# Patient Record
Sex: MALE | Race: WHITE | NOT HISPANIC OR LATINO | URBAN - METROPOLITAN AREA
[De-identification: names, ages, dates, MRNs, and addresses within clinical notes are randomized per-mention and may not be internally consistent; named-entity substitution may affect disease eponyms.]

---

## 2023-04-24 ENCOUNTER — NURSING HOME VISIT (OUTPATIENT)
Dept: POST ACUTE CARE | Facility: EXTERNAL LOCATION | Age: 88
End: 2023-04-24

## 2023-04-24 VITALS — HEART RATE: 76 BPM | DIASTOLIC BLOOD PRESSURE: 82 MMHG | TEMPERATURE: 97.9 F | SYSTOLIC BLOOD PRESSURE: 126 MMHG

## 2023-04-24 DIAGNOSIS — I25.10 ATHEROSCLEROSIS OF NATIVE CORONARY ARTERY OF NATIVE HEART WITHOUT ANGINA PECTORIS: ICD-10-CM

## 2023-04-24 DIAGNOSIS — C61 MALIGNANT NEOPLASM OF PROSTATE (MULTI): ICD-10-CM

## 2023-04-24 DIAGNOSIS — I10 PRIMARY HYPERTENSION: Primary | ICD-10-CM

## 2023-04-24 DIAGNOSIS — R41.81 AGE-RELATED COGNITIVE DECLINE: ICD-10-CM

## 2023-04-24 DIAGNOSIS — E78.49 OTHER HYPERLIPIDEMIA: ICD-10-CM

## 2023-04-24 PROCEDURE — 99342 HOME/RES VST NEW LOW MDM 30: CPT | Performed by: NURSE PRACTITIONER

## 2023-04-24 NOTE — ASSESSMENT & PLAN NOTE
Resident stable and problem well controlled. Will continue same orders and treatment  On metoprolol

## 2023-04-24 NOTE — PROGRESS NOTES
Subjective   Mandy Wong is a 88 y.o. male who is assisted living/ home patient being seen and evaluated for multiple medical problems.    50% of time was spent on preparing to see the patient, performing exam or evaluation, coordination of care, ordering medications,tests and labs, referring and communicating with other health care providers , interpreting results, obtaining and reviewing history, counseling and educating the family/patient explanation of tests, medications and documenting clinical information  EMR. Time spent >35 minutes    88y old male now a resident at [facility]. He is from [place] and his wife recently broke her femur, she is his care taker because of his cognitive decline .They moved to be closer to family for now. 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 Current medications; metoprolol,Crestor and ASA. No current concerns             Objective   /82   Pulse 76   Temp 36.6 °C (97.9 °F)     Physical Exam  Vitals and nursing note reviewed.   Constitutional:       General: He is not in acute distress.  HENT:      Head: Normocephalic and atraumatic.   Eyes:      Pupils: Pupils are equal, round, and reactive to light.   Cardiovascular:      Rate and Rhythm: Normal rate and regular rhythm.   Pulmonary:      Effort: Pulmonary effort is normal.      Breath sounds: Normal breath sounds.   Musculoskeletal:      Right lower leg: No edema.      Left lower leg: No edema.   Skin:     General: Skin is warm and dry.   Neurological:      Mental Status: He is alert.   Psychiatric:         Mood and Affect: Mood normal.         Assessment/Plan   Problem List Items Addressed This Visit       Malignant neoplasm of prostate (CMS/HCC)    Other hyperlipidemia     Resident stable and problem well controlled. Will continue same orders and treatment  On crestor         Primary hypertension - Primary     Resident stable and problem well controlled. Will continue same orders and treatment  On  metoprolol         Atherosclerosis of native coronary artery of native heart without angina pectoris     On ASA  Resident stable and problem well controlled. Will continue same orders and treatment         Age-related cognitive decline

## 2023-06-19 ENCOUNTER — NURSING HOME VISIT (OUTPATIENT)
Dept: POST ACUTE CARE | Facility: EXTERNAL LOCATION | Age: 88
End: 2023-06-19

## 2023-06-19 VITALS
DIASTOLIC BLOOD PRESSURE: 69 MMHG | SYSTOLIC BLOOD PRESSURE: 145 MMHG | WEIGHT: 131.4 LBS | TEMPERATURE: 97.8 F | HEART RATE: 61 BPM

## 2023-06-19 DIAGNOSIS — R41.81 AGE-RELATED COGNITIVE DECLINE: Primary | ICD-10-CM

## 2023-06-19 DIAGNOSIS — F02.B0 MODERATE LATE ONSET ALZHEIMER'S DEMENTIA WITHOUT BEHAVIORAL DISTURBANCE, PSYCHOTIC DISTURBANCE, MOOD DISTURBANCE, OR ANXIETY (MULTI): ICD-10-CM

## 2023-06-19 DIAGNOSIS — G30.1 MODERATE LATE ONSET ALZHEIMER'S DEMENTIA WITHOUT BEHAVIORAL DISTURBANCE, PSYCHOTIC DISTURBANCE, MOOD DISTURBANCE, OR ANXIETY (MULTI): ICD-10-CM

## 2023-06-19 PROCEDURE — 99349 HOME/RES VST EST MOD MDM 40: CPT | Performed by: NURSE PRACTITIONER

## 2023-06-19 NOTE — PROGRESS NOTES
Subjective   Mandy Wong is a 88 y.o. male who is assisted living/ home patient being seen and evaluated for multiple medical problems.    Asked to visit because of concerns of his forgetfulness. He most likely has some age related dementia, he has recently moved here from [place] with his wife and he wants to go back, he is worried about his home. He is unable to given me today's date, he cannot recognize family members in pictures and unable to remember their names . His wife admits to becoming a little impatient with him because of his forgetfulness. He does not wander and is not combative or angry. Plan to check labs and monitor, she does not want to start any medications at this time. Blood work done 1 month ago is good.     50% of time was spent on preparing to see the patient, performing exam or evaluation, coordination of care, ordering medications,tests and labs, referring and communicating with other health care providers , interpreting results, obtaining and reviewing history, tests, medications and documenting clinical information  EMR. Time spent >35 minutes             Objective   /69   Pulse 61   Temp 36.6 °C (97.8 °F)   Wt 59.6 kg (131 lb 6.4 oz)     Physical Exam  Vitals and nursing note reviewed.   Constitutional:       General: He is not in acute distress.  HENT:      Head: Normocephalic and atraumatic.      Ears:      Comments: [place]  Pulmonary:      Effort: Pulmonary effort is normal.   Musculoskeletal:      Right lower leg: No edema.      Left lower leg: No edema.   Skin:     General: Skin is warm and dry.   Neurological:      Mental Status: He is alert. He is disoriented.   Psychiatric:         Mood and Affect: Mood normal.         Assessment/Plan   Problem List Items Addressed This Visit       Age-related cognitive decline - Primary    Moderate late onset Alzheimer's dementia without behavioral disturbance, psychotic disturbance, mood disturbance, or anxiety (CMS/[facility])     Patient stable,  will continue same treatment and monitor. Nursing to inform CNP/MD of any changes in condition or new problems    Recheck CBC, BMP and UA C&S

## 2023-06-19 NOTE — ASSESSMENT & PLAN NOTE
Patient stable, will continue same treatment and monitor. Nursing to inform CNP/MD of any changes in condition or new problems    Recheck CBC, BMP and UA C&S

## 2023-07-22 PROCEDURE — G0180 MD CERTIFICATION HHA PATIENT: HCPCS | Performed by: STUDENT IN AN ORGANIZED HEALTH CARE EDUCATION/TRAINING PROGRAM

## 2023-08-16 ENCOUNTER — NURSING HOME VISIT (OUTPATIENT)
Dept: PRIMARY CARE | Facility: CLINIC | Age: 88
End: 2023-08-16

## 2023-08-16 DIAGNOSIS — E78.49 OTHER HYPERLIPIDEMIA: ICD-10-CM

## 2023-08-16 DIAGNOSIS — C61 MALIGNANT NEOPLASM OF PROSTATE (MULTI): ICD-10-CM

## 2023-08-16 DIAGNOSIS — G30.1 MODERATE LATE ONSET ALZHEIMER'S DEMENTIA WITHOUT BEHAVIORAL DISTURBANCE, PSYCHOTIC DISTURBANCE, MOOD DISTURBANCE, OR ANXIETY (MULTI): ICD-10-CM

## 2023-08-16 DIAGNOSIS — I10 PRIMARY HYPERTENSION: Primary | ICD-10-CM

## 2023-08-16 DIAGNOSIS — F02.B0 MODERATE LATE ONSET ALZHEIMER'S DEMENTIA WITHOUT BEHAVIORAL DISTURBANCE, PSYCHOTIC DISTURBANCE, MOOD DISTURBANCE, OR ANXIETY (MULTI): ICD-10-CM

## 2023-08-16 DIAGNOSIS — I25.10 ATHEROSCLEROSIS OF NATIVE CORONARY ARTERY OF NATIVE HEART WITHOUT ANGINA PECTORIS: ICD-10-CM

## 2023-08-16 DIAGNOSIS — R41.81 AGE-RELATED COGNITIVE DECLINE: ICD-10-CM

## 2023-08-16 PROCEDURE — 99344 HOME/RES VST NEW MOD MDM 60: CPT | Performed by: STUDENT IN AN ORGANIZED HEALTH CARE EDUCATION/TRAINING PROGRAM

## 2023-08-24 NOTE — PROGRESS NOTES
Subjective   Patient ID: Mandy Wong is a 88 y.o. male.       Pt is a 88 year old male who resides at AL in [place] [facility]. PMHx includes HLD, HTN, CAD who reports no acute complaints or concerns. Recently had a fall where he was noted to have a wound. Home health care is currently following for this. He reports no issues and wound is healing well. Does note that he has been having issues with bradycardia. Regards no additional issues or concerns at this time.           Review of Systems   Constitutional: Negative.    HENT: Negative.     Respiratory: Negative.     Cardiovascular: Negative.    Gastrointestinal: Negative.    Musculoskeletal: Negative.    Skin:  Positive for wound.   Neurological: Negative.    Psychiatric/Behavioral: Negative.                 Objective   Vitals Reviewed via facility EMR   Physical Exam  Constitutional:       General: She is not in acute distress.     Appearance: She is not ill-appearing.   HENT:      Mouth/Throat:      Mouth: Mucous membranes are moist.      Pharynx: Oropharynx is clear. No oropharyngeal exudate or posterior oropharyngeal erythema.   Eyes:      Pupils: Pupils are equal, round, and reactive to light.   Cardiovascular:      Rate and Rhythm: Bradycardia present. Rhythm irregular.      Heart sounds: No murmur heard.  Pulmonary:      Effort: Pulmonary effort is normal. No respiratory distress.      Breath sounds: No wheezing.   Abdominal:      General: Abdomen is flat. Bowel sounds are normal. There is no distension.      Palpations: Abdomen is soft.      Tenderness: There is no abdominal tenderness.   Musculoskeletal:         General: No tenderness. Normal range of motion.   Skin:     General: Skin is warm and dry.      Comments: RLE wound wrapped   Neurological:      General: No focal deficit present.      Mental Status: She is alert and oriented to person, place, and time. Mental status is at baseline.   Psychiatric:         Mood and Affect: Mood normal.                     Pt seen and examined, noted bradycardia on exam, recommend ECG, is currently asymptomatic at this time. Medications reviewed, overall stable. Continue supportive care, routine labs       Assessment/Plan   Diagnoses and all orders for this visit:  Primary hypertension  Other hyperlipidemia  Atherosclerosis of native coronary artery of native heart without angina pectoris  Moderate late onset Alzheimer's dementia without behavioral disturbance, psychotic disturbance, mood disturbance, or anxiety (CMS/HCC)  Age-related cognitive decline  Malignant neoplasm of prostate (CMS/HCC)        Pt seen and examined, noted bradycardia on exam, recommend ECG, is currently asymptomatic at this time. Medications reviewed, overall stable. Continue supportive care, routine labs    >60 minutes spent in management of pt

## 2023-09-06 ENCOUNTER — NURSING HOME VISIT (OUTPATIENT)
Dept: POST ACUTE CARE | Facility: EXTERNAL LOCATION | Age: 88
End: 2023-09-06
Payer: MEDICARE

## 2023-09-06 DIAGNOSIS — I25.10 ATHEROSCLEROSIS OF NATIVE CORONARY ARTERY OF NATIVE HEART WITHOUT ANGINA PECTORIS: ICD-10-CM

## 2023-09-06 DIAGNOSIS — F02.B0 MODERATE LATE ONSET ALZHEIMER'S DEMENTIA WITHOUT BEHAVIORAL DISTURBANCE, PSYCHOTIC DISTURBANCE, MOOD DISTURBANCE, OR ANXIETY (MULTI): ICD-10-CM

## 2023-09-06 DIAGNOSIS — I10 PRIMARY HYPERTENSION: Primary | ICD-10-CM

## 2023-09-06 DIAGNOSIS — U07.1 COVID-19: ICD-10-CM

## 2023-09-06 DIAGNOSIS — G30.1 MODERATE LATE ONSET ALZHEIMER'S DEMENTIA WITHOUT BEHAVIORAL DISTURBANCE, PSYCHOTIC DISTURBANCE, MOOD DISTURBANCE, OR ANXIETY (MULTI): ICD-10-CM

## 2023-09-06 DIAGNOSIS — C61 MALIGNANT NEOPLASM OF PROSTATE (MULTI): ICD-10-CM

## 2023-09-06 PROCEDURE — 99342 HOME/RES VST NEW LOW MDM 30: CPT | Performed by: STUDENT IN AN ORGANIZED HEALTH CARE EDUCATION/TRAINING PROGRAM

## 2023-09-06 NOTE — LETTER
September 10, 2023     Cr carballo    Patient: Mandy Wong   YOB: 1935   Date of Visit: 9/6/2023       Dear Dr. Cr carballo:    Thank you for referring Mandy Wong to me for evaluation. Below are my notes for this consultation.  If you have questions, please do not hesitate to call me. I look forward to following your patient along with you.       Sincerely,     Yonatan Blood,       CC: No Recipients  ______________________________________________________________________________________    Subjective  Patient ID: Mandy Wong is a 88 y.o. male.    Pt seen and examined. States overall doing well. Recently tested positive for covid-19. States no acute complaints or concerns at this time.         Review of Systems   Constitutional: Negative.    HENT: Negative.     Respiratory:  Positive for cough.    Cardiovascular: Negative.    Gastrointestinal: Negative.    Musculoskeletal: Negative.    Skin: Negative.    Neurological: Negative.    Psychiatric/Behavioral: Negative.         ObjectiveVitals Reviewed via facility EMR   Physical Exam  Vitals and nursing note reviewed.   Constitutional:       General: He is not in acute distress.     Appearance: Normal appearance.   HENT:      Mouth/Throat:      Mouth: Mucous membranes are moist.      Pharynx: Oropharynx is clear. No oropharyngeal exudate.   Eyes:      Extraocular Movements: Extraocular movements intact.      Pupils: Pupils are equal, round, and reactive to light.   Cardiovascular:      Rate and Rhythm: Normal rate and regular rhythm.      Pulses: Normal pulses.      Heart sounds: Normal heart sounds. No murmur heard.  Pulmonary:      Effort: Pulmonary effort is normal. No respiratory distress.   Abdominal:      General: Abdomen is flat. Bowel sounds are normal. There is no distension.      Tenderness: There is no abdominal tenderness.   Musculoskeletal:         General: Normal range of motion.      Right lower leg: No edema.      Left  lower leg: No edema.   Skin:     General: Skin is warm and dry.      Capillary Refill: Capillary refill takes less than 2 seconds.   Neurological:      General: No focal deficit present.      Mental Status: He is alert. Mental status is at baseline.      Cranial Nerves: No cranial nerve deficit.      Motor: No weakness.   Psychiatric:         Mood and Affect: Mood normal.         Thought Content: Thought content normal.         Assessment/Plan  Diagnoses and all orders for this visit:  Primary hypertension  Atherosclerosis of native coronary artery of native heart without angina pectoris  Malignant neoplasm of prostate (CMS/HCC)  Moderate late onset Alzheimer's dementia without behavioral disturbance, psychotic disturbance, mood disturbance, or anxiety (CMS/HCC)  COVID-19        Pt seen and examined, medically stable from COVID-19. Started on anti-virals. Continue supportive care, breathing treatments. Closely monitor vitals

## 2023-09-10 PROBLEM — U07.1 COVID-19: Status: ACTIVE | Noted: 2023-09-10

## 2023-09-10 ASSESSMENT — ENCOUNTER SYMPTOMS
CONSTITUTIONAL NEGATIVE: 1
GASTROINTESTINAL NEGATIVE: 1
COUGH: 1
CARDIOVASCULAR NEGATIVE: 1
MUSCULOSKELETAL NEGATIVE: 1
PSYCHIATRIC NEGATIVE: 1
NEUROLOGICAL NEGATIVE: 1

## 2023-09-10 NOTE — PROGRESS NOTES
Subjective   Patient ID: Mandy Wong is a 88 y.o. male.    Pt seen and examined. States overall doing well. Recently tested positive for covid-19. States no acute complaints or concerns at this time.         Review of Systems   Constitutional: Negative.    HENT: Negative.     Respiratory:  Positive for cough.    Cardiovascular: Negative.    Gastrointestinal: Negative.    Musculoskeletal: Negative.    Skin: Negative.    Neurological: Negative.    Psychiatric/Behavioral: Negative.         Objective Vitals Reviewed via facility EMR   Physical Exam  Vitals and nursing note reviewed.   Constitutional:       General: He is not in acute distress.     Appearance: Normal appearance.   HENT:      Mouth/Throat:      Mouth: Mucous membranes are moist.      Pharynx: Oropharynx is clear. No oropharyngeal exudate.   Eyes:      Extraocular Movements: Extraocular movements intact.      Pupils: Pupils are equal, round, and reactive to light.   Cardiovascular:      Rate and Rhythm: Normal rate and regular rhythm.      Pulses: Normal pulses.      Heart sounds: Normal heart sounds. No murmur heard.  Pulmonary:      Effort: Pulmonary effort is normal. No respiratory distress.   Abdominal:      General: Abdomen is flat. Bowel sounds are normal. There is no distension.      Tenderness: There is no abdominal tenderness.   Musculoskeletal:         General: Normal range of motion.      Right lower leg: No edema.      Left lower leg: No edema.   Skin:     General: Skin is warm and dry.      Capillary Refill: Capillary refill takes less than 2 seconds.   Neurological:      General: No focal deficit present.      Mental Status: He is alert. Mental status is at baseline.      Cranial Nerves: No cranial nerve deficit.      Motor: No weakness.   Psychiatric:         Mood and Affect: Mood normal.         Thought Content: Thought content normal.         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 Started on anti-virals. Continue supportive care, breathing treatments. Closely monitor vitals

## 2023-09-27 ENCOUNTER — NURSING HOME VISIT (OUTPATIENT)
Dept: POST ACUTE CARE | Facility: EXTERNAL LOCATION | Age: 88
End: 2023-09-27
Payer: MEDICARE

## 2023-09-27 DIAGNOSIS — F43.0 ACUTE STRESS REACTION: ICD-10-CM

## 2023-09-27 DIAGNOSIS — R41.81 AGE-RELATED COGNITIVE DECLINE: ICD-10-CM

## 2023-09-27 DIAGNOSIS — G30.1 MODERATE LATE ONSET ALZHEIMER'S DEMENTIA WITHOUT BEHAVIORAL DISTURBANCE, PSYCHOTIC DISTURBANCE, MOOD DISTURBANCE, OR ANXIETY (MULTI): ICD-10-CM

## 2023-09-27 DIAGNOSIS — F02.B0 MODERATE LATE ONSET ALZHEIMER'S DEMENTIA WITHOUT BEHAVIORAL DISTURBANCE, PSYCHOTIC DISTURBANCE, MOOD DISTURBANCE, OR ANXIETY (MULTI): ICD-10-CM

## 2023-09-27 DIAGNOSIS — U07.1 COVID-19: ICD-10-CM

## 2023-09-27 DIAGNOSIS — I10 PRIMARY HYPERTENSION: Primary | ICD-10-CM

## 2023-09-27 PROCEDURE — 99348 HOME/RES VST EST LOW MDM 30: CPT | Performed by: STUDENT IN AN ORGANIZED HEALTH CARE EDUCATION/TRAINING PROGRAM

## 2023-09-30 PROBLEM — F43.0 ACUTE STRESS REACTION: Status: ACTIVE | Noted: 2023-09-30

## 2023-09-30 ASSESSMENT — ENCOUNTER SYMPTOMS
CONSTITUTIONAL NEGATIVE: 1
RESPIRATORY NEGATIVE: 1
NEUROLOGICAL NEGATIVE: 1
CARDIOVASCULAR NEGATIVE: 1
GASTROINTESTINAL NEGATIVE: 1
MUSCULOSKELETAL NEGATIVE: 1
PSYCHIATRIC NEGATIVE: 1

## 2023-10-01 NOTE — PROGRESS NOTES
Subjective   Patient ID: Mandy Wong is a 88 y.o. male.    Patient seen and evaluated.  Was seen on COVID follow-up.  With regards to COVID-19, he reports that he is overall feeling well states he is asymptomatic from this.  In regards no acute complaints or concerns.  He states that he is somewhat stressed out though with his wife being in the hospital recently.  Regards no other issues or concerns.        Review of Systems   Constitutional: Negative.    HENT: Negative.     Respiratory: Negative.     Cardiovascular: Negative.    Gastrointestinal: Negative.    Musculoskeletal: Negative.    Skin: Negative.    Neurological: Negative.    Psychiatric/Behavioral: Negative.         Objective Vitals Reviewed via facility EMR   Physical Exam  Constitutional:       General: He is not in acute distress.     Appearance: He is not ill-appearing.   Eyes:      Pupils: Pupils are equal, round, and reactive to light.   Cardiovascular:      Rate and Rhythm: Normal rate and regular rhythm.      Pulses: Normal pulses.      Heart sounds: No murmur heard.  Pulmonary:      Effort: No respiratory distress.      Breath sounds: No wheezing.   Abdominal:      General: Abdomen is flat. Bowel sounds are normal. There is no distension.   Musculoskeletal:      Right lower leg: No edema.      Left lower leg: No edema.   Skin:     General: Skin is warm and dry.   Neurological:      Mental Status: He is alert. Mental status is at baseline.      Cranial Nerves: No cranial nerve deficit.      Motor: No weakness.   Psychiatric:         Mood and Affect: Mood normal.         Behavior: Behavior normal.         Assessment/Plan   Diagnoses and all orders for this visit:  Primary hypertension  Moderate late onset Alzheimer's dementia without behavioral disturbance, psychotic disturbance, mood disturbance, or anxiety (CMS/HCC)  Age-related cognitive decline  COVID-19      Patient seen and examined.  Overall medically stable recovered well from COVID-19  monitor mood with wife being in the hospital as he seems to be having some underlying anxiety and stress secondary to this.  Continue all current medications.  No additional issues at this time.    Yonatan Blood DO

## 2023-10-10 ENCOUNTER — NURSING HOME VISIT (OUTPATIENT)
Dept: POST ACUTE CARE | Facility: EXTERNAL LOCATION | Age: 88
End: 2023-10-10
Payer: MEDICARE

## 2023-10-10 DIAGNOSIS — R41.81 AGE-RELATED COGNITIVE DECLINE: Primary | ICD-10-CM

## 2023-10-10 DIAGNOSIS — G30.1 MODERATE LATE ONSET ALZHEIMER'S DEMENTIA WITHOUT BEHAVIORAL DISTURBANCE, PSYCHOTIC DISTURBANCE, MOOD DISTURBANCE, OR ANXIETY (MULTI): ICD-10-CM

## 2023-10-10 DIAGNOSIS — F02.B0 MODERATE LATE ONSET ALZHEIMER'S DEMENTIA WITHOUT BEHAVIORAL DISTURBANCE, PSYCHOTIC DISTURBANCE, MOOD DISTURBANCE, OR ANXIETY (MULTI): ICD-10-CM

## 2023-10-10 DIAGNOSIS — I10 PRIMARY HYPERTENSION: ICD-10-CM

## 2023-10-10 PROBLEM — U07.1 COVID-19: Status: RESOLVED | Noted: 2023-09-10 | Resolved: 2023-10-10

## 2023-10-10 PROCEDURE — 99348 HOME/RES VST EST LOW MDM 30: CPT

## 2023-10-10 NOTE — PROGRESS NOTES
Visit  Note   Subjective   Mandy Wong is a 88 y.o. male who is being seen and evaluated for multiple medical problems. Nursing notes, vital signs, and labs were reviewed in the local facility chart.    Mandy is an 88-year-old male being seen in his facility this afternoon.  He denies any current complaints, and is seen ambulating and working in his garden during the visit.  He is inattentive but appears at his baseline mental status.  He is appropriately responsive to verbal stimuli and cooperative for the visit, but can become tangential.  He states that he has not noticed any residual COVID symptoms.       Objective   There were no vitals taken for this visit., vitals reviewed in nursing charts at facility  Physical Exam  Vitals reviewed.   Constitutional:       Appearance: Normal appearance.   HENT:      Head: Normocephalic and atraumatic.   Eyes:      Conjunctiva/sclera: Conjunctivae normal.   Cardiovascular:      Rate and Rhythm: Normal rate and regular rhythm.      Pulses: Normal pulses.      Heart sounds: Normal heart sounds.   Pulmonary:      Effort: Pulmonary effort is normal.      Breath sounds: Normal breath sounds.   Musculoskeletal:      Cervical back: Normal range of motion.   Skin:     General: Skin is warm and dry.   Neurological:      Mental Status: He is alert and oriented to person, place, and time.   Psychiatric:         Attention and Perception: He is inattentive.         Mood and Affect: Mood normal.         Behavior: Behavior normal. Behavior is cooperative.         Thought Content: Thought content normal.         Judgment: Judgment normal.       Assessment/Plan   Problem List Items Addressed This Visit       Primary hypertension    Age-related cognitive decline - Primary     -Doing well, no complaints today. Feels better since recovering from COVID  -Was gardening and ambulating without difficulty  -Was engaged and participating in examination         Moderate late onset Alzheimer's  dementia without behavioral disturbance, psychotic disturbance, mood disturbance, or anxiety (CMS/HCC)     -Patient continues to be stable, will continue treatment and monitor for mental status changes          Meds, orders, nursing notes reviewed.  Nurse will monitor and update physician of any change: Discussed with patient and provided information.  Supportive care will be given.  Follow-up in 1 month or earlier as needed.    Beatrice Dixon DO  Family Medicine Resident, PGY-1

## 2023-10-10 NOTE — ASSESSMENT & PLAN NOTE
-Doing well, no complaints today. Feels better since recovering from COVID  -Was gardening and ambulating without difficulty  -Was engaged and participating in examination

## 2023-11-07 ENCOUNTER — NURSING HOME VISIT (OUTPATIENT)
Dept: POST ACUTE CARE | Facility: EXTERNAL LOCATION | Age: 88
End: 2023-11-07
Payer: MEDICARE

## 2023-11-07 DIAGNOSIS — I10 PRIMARY HYPERTENSION: ICD-10-CM

## 2023-11-07 DIAGNOSIS — F02.B0 MODERATE LATE ONSET ALZHEIMER'S DEMENTIA WITHOUT BEHAVIORAL DISTURBANCE, PSYCHOTIC DISTURBANCE, MOOD DISTURBANCE, OR ANXIETY (MULTI): Primary | ICD-10-CM

## 2023-11-07 DIAGNOSIS — G30.1 MODERATE LATE ONSET ALZHEIMER'S DEMENTIA WITHOUT BEHAVIORAL DISTURBANCE, PSYCHOTIC DISTURBANCE, MOOD DISTURBANCE, OR ANXIETY (MULTI): Primary | ICD-10-CM

## 2023-11-07 DIAGNOSIS — I25.10 ATHEROSCLEROSIS OF NATIVE CORONARY ARTERY OF NATIVE HEART WITHOUT ANGINA PECTORIS: ICD-10-CM

## 2023-11-07 PROCEDURE — 99347 HOME/RES VST EST SF MDM 20: CPT

## 2023-11-07 NOTE — PROGRESS NOTES
Visit  Note   Subjective   Mandy Wong is a 88 y.o. male who is being seen and evaluated for multiple medical problems. Nursing notes, vital signs, and labs were reviewed in the local facility chart.    This is a 89 yo male with a PMHx Alzheimers dementia, HTN, and CAD who was examined today in his home. He is doing well overall and has no complaints today. He is staying active and doing yard work outside with the weather permitting.        Objective   There were no vitals taken for this visit.  Physical Exam  Constitutional:       General: He is not in acute distress.     Appearance: Normal appearance. He is not ill-appearing.   HENT:      Head: Normocephalic and atraumatic.      Right Ear: External ear normal.      Left Ear: External ear normal.   Eyes:      Conjunctiva/sclera: Conjunctivae normal.   Cardiovascular:      Rate and Rhythm: Normal rate and regular rhythm.      Pulses: Normal pulses.      Heart sounds: Normal heart sounds.   Pulmonary:      Effort: Pulmonary effort is normal.      Breath sounds: Normal breath sounds.   Abdominal:      General: There is no distension.      Palpations: Abdomen is soft.      Tenderness: There is no abdominal tenderness.   Musculoskeletal:      Cervical back: Normal range of motion.      Right lower leg: No edema.      Left lower leg: No edema.   Skin:     General: Skin is warm and dry.   Neurological:      General: No focal deficit present.      Mental Status: He is alert. Mental status is at baseline.   Psychiatric:         Mood and Affect: Mood normal.         Behavior: Behavior normal.       Assessment/Plan     Meds, orders, nursing notes reviewed.  Nurse will monitor and update physician of any change: Discussed with patient and provided information.  Supportive care will be given.  Follow-up in 1 month or earlier as needed.     Problem List Items Addressed This Visit       Primary hypertension     -Stable and problem well controlled, will continue same orders and  treatment (Metoprolol)         Atherosclerosis of native coronary artery of native heart without angina pectoris     -On ASA, he is stable and this problem well controlled. Will continue same orders and treatment          Moderate late onset Alzheimer's dementia without behavioral disturbance, psychotic disturbance, mood disturbance, or anxiety (CMS/HCC) - Primary     -Patient continues to be stable, will continue current treatments and monitor for mental status changes             Beatrice Dixon DO  Family Medicine Resident, PGY-1

## 2023-11-07 NOTE — ASSESSMENT & PLAN NOTE
-Patient continues to be stable, will continue current treatments and monitor for mental status changes

## 2023-12-20 ENCOUNTER — NURSING HOME VISIT (OUTPATIENT)
Dept: POST ACUTE CARE | Facility: EXTERNAL LOCATION | Age: 88
End: 2023-12-20
Payer: MEDICARE

## 2023-12-20 DIAGNOSIS — F52.8 HYPERSEXUALITY: Primary | ICD-10-CM

## 2023-12-20 DIAGNOSIS — F02.B0 MODERATE LATE ONSET ALZHEIMER'S DEMENTIA WITHOUT BEHAVIORAL DISTURBANCE, PSYCHOTIC DISTURBANCE, MOOD DISTURBANCE, OR ANXIETY (MULTI): ICD-10-CM

## 2023-12-20 DIAGNOSIS — I10 PRIMARY HYPERTENSION: ICD-10-CM

## 2023-12-20 DIAGNOSIS — G30.1 MODERATE LATE ONSET ALZHEIMER'S DEMENTIA WITHOUT BEHAVIORAL DISTURBANCE, PSYCHOTIC DISTURBANCE, MOOD DISTURBANCE, OR ANXIETY (MULTI): ICD-10-CM

## 2023-12-20 PROCEDURE — 99349 HOME/RES VST EST MOD MDM 40: CPT | Performed by: STUDENT IN AN ORGANIZED HEALTH CARE EDUCATION/TRAINING PROGRAM

## 2023-12-23 PROBLEM — F52.8 HYPERSEXUALITY: Status: ACTIVE | Noted: 2023-12-23

## 2023-12-23 ASSESSMENT — ENCOUNTER SYMPTOMS
ABDOMINAL PAIN: 0
FEVER: 0
DIARRHEA: 0
CHEST TIGHTNESS: 0
DIZZINESS: 0
RESPIRATORY NEGATIVE: 1
GASTROINTESTINAL NEGATIVE: 1
AGITATION: 0
MUSCULOSKELETAL NEGATIVE: 1
ACTIVITY CHANGE: 0
CARDIOVASCULAR NEGATIVE: 1
PALPITATIONS: 0
NAUSEA: 0
PSYCHIATRIC NEGATIVE: 1
CONSTITUTIONAL NEGATIVE: 1
SHORTNESS OF BREATH: 0
ARTHRALGIAS: 0
ABDOMINAL DISTENTION: 0
NEUROLOGICAL NEGATIVE: 1

## 2023-12-24 NOTE — PROGRESS NOTES
Subjective   Patient ID: Mandy Wong is a 88 y.o. male.    Patient seen and evaluated at bedside.  Was evaluated due to concerns of nursing facility.  Patient has overall been fairly hypersexual over the past month or so.  In addition, he has becoming more forgetful as well.  This is new for him, and has been very frustrating for his family as well as caregivers as well.  Otherwise, he reports no additional issues or concerns.        Review of Systems   Constitutional: Negative.  Negative for activity change and fever.   HENT: Negative.     Respiratory: Negative.  Negative for chest tightness and shortness of breath.    Cardiovascular: Negative.  Negative for chest pain, palpitations and leg swelling.   Gastrointestinal: Negative.  Negative for abdominal distention, abdominal pain, diarrhea and nausea.   Musculoskeletal: Negative.  Negative for arthralgias.   Skin: Negative.    Neurological: Negative.  Negative for dizziness and syncope.   Psychiatric/Behavioral: Negative.  Negative for agitation and behavioral problems.        Objective Vitals Reviewed via facility EMR   Physical Exam  Constitutional:       General: He is not in acute distress.     Appearance: He is not ill-appearing.   Eyes:      Pupils: Pupils are equal, round, and reactive to light.   Cardiovascular:      Rate and Rhythm: Normal rate and regular rhythm.      Pulses: Normal pulses.      Heart sounds: No murmur heard.  Pulmonary:      Effort: No respiratory distress.      Breath sounds: No wheezing.   Abdominal:      General: Abdomen is flat. Bowel sounds are normal. There is no distension.   Musculoskeletal:      Right lower leg: No edema.      Left lower leg: No edema.   Skin:     General: Skin is warm and dry.   Neurological:      Mental Status: He is alert. Mental status is at baseline.      Cranial Nerves: No cranial nerve deficit.      Motor: No weakness.   Psychiatric:         Mood and Affect: Mood normal.         Behavior: Behavior normal.          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  Recommend initiation of lexapro.  We will consult psychiatry for further evaluation and recommendations.  Appreciate recommendations.  Continue supportive care.    Yonatan Blood DO

## 2024-01-04 ENCOUNTER — NURSING HOME VISIT (OUTPATIENT)
Dept: POST ACUTE CARE | Facility: EXTERNAL LOCATION | Age: 89
End: 2024-01-04
Payer: MEDICARE

## 2024-01-04 DIAGNOSIS — F52.8 HYPERSEXUALITY: ICD-10-CM

## 2024-01-04 DIAGNOSIS — G30.1 MODERATE LATE ONSET ALZHEIMER'S DEMENTIA WITHOUT BEHAVIORAL DISTURBANCE, PSYCHOTIC DISTURBANCE, MOOD DISTURBANCE, OR ANXIETY (MULTI): ICD-10-CM

## 2024-01-04 DIAGNOSIS — F02.B0 MODERATE LATE ONSET ALZHEIMER'S DEMENTIA WITHOUT BEHAVIORAL DISTURBANCE, PSYCHOTIC DISTURBANCE, MOOD DISTURBANCE, OR ANXIETY (MULTI): ICD-10-CM

## 2024-01-04 DIAGNOSIS — R41.81 AGE-RELATED COGNITIVE DECLINE: ICD-10-CM

## 2024-01-04 DIAGNOSIS — I10 PRIMARY HYPERTENSION: Primary | ICD-10-CM

## 2024-01-04 PROCEDURE — 99348 HOME/RES VST EST LOW MDM 30: CPT | Performed by: STUDENT IN AN ORGANIZED HEALTH CARE EDUCATION/TRAINING PROGRAM

## 2024-01-07 ASSESSMENT — ENCOUNTER SYMPTOMS
PSYCHIATRIC NEGATIVE: 1
MUSCULOSKELETAL NEGATIVE: 1
GASTROINTESTINAL NEGATIVE: 1
CARDIOVASCULAR NEGATIVE: 1
NEUROLOGICAL NEGATIVE: 1
CONSTITUTIONAL NEGATIVE: 1
RESPIRATORY NEGATIVE: 1

## 2024-01-07 NOTE — PROGRESS NOTES
Subjective   Patient ID: Mandy Wong is a 88 y.o. male.    Patient seen and examined on follow-up.  Recently was started on SSRI for abnormal mood and behaviors.  Per the patient, he reports no issues or side effects of the medication, wife also reports some improvement in mood.  No additional issues at this time.        Review of Systems   Constitutional: Negative.    HENT: Negative.     Respiratory: Negative.     Cardiovascular: Negative.    Gastrointestinal: Negative.    Musculoskeletal: Negative.    Skin: Negative.    Neurological: Negative.    Psychiatric/Behavioral: Negative.         Objective Vitals Reviewed via facility EMR   Physical Exam  Constitutional:       General: He is not in acute distress.     Appearance: He is not ill-appearing.   Eyes:      Pupils: Pupils are equal, round, and reactive to light.   Cardiovascular:      Rate and Rhythm: Normal rate and regular rhythm.      Pulses: Normal pulses.      Heart sounds: No murmur heard.  Pulmonary:      Effort: No respiratory distress.      Breath sounds: No wheezing.   Abdominal:      General: Abdomen is flat. Bowel sounds are normal. There is no distension.   Musculoskeletal:      Right lower leg: No edema.      Left lower leg: No edema.   Skin:     General: Skin is warm and dry.   Neurological:      Mental Status: He is alert. Mental status is at baseline.      Cranial Nerves: No cranial nerve deficit.      Motor: No weakness.   Psychiatric:         Mood and Affect: Mood normal.         Behavior: Behavior normal.         Assessment/Plan   Diagnoses and all orders for this visit:  Primary hypertension  Moderate late onset Alzheimer's dementia without behavioral disturbance, psychotic disturbance, mood disturbance, or anxiety (CMS/HCC)  Hypersexuality  Age-related cognitive decline    Patient seen and examined, is having no negative side effects of SSRI, continue current medications, appreciate psych recommendations.  Will closely monitor mood, staff  has also noticed improvement as well with regards to previous issues that were addressed at last visit.    Yonatan Blood DO

## 2024-02-06 ENCOUNTER — NURSING HOME VISIT (OUTPATIENT)
Dept: POST ACUTE CARE | Facility: EXTERNAL LOCATION | Age: 89
End: 2024-02-06

## 2024-02-06 DIAGNOSIS — G30.1 MODERATE LATE ONSET ALZHEIMER'S DEMENTIA WITHOUT BEHAVIORAL DISTURBANCE, PSYCHOTIC DISTURBANCE, MOOD DISTURBANCE, OR ANXIETY (MULTI): ICD-10-CM

## 2024-02-06 DIAGNOSIS — E78.49 OTHER HYPERLIPIDEMIA: ICD-10-CM

## 2024-02-06 DIAGNOSIS — F02.B0 MODERATE LATE ONSET ALZHEIMER'S DEMENTIA WITHOUT BEHAVIORAL DISTURBANCE, PSYCHOTIC DISTURBANCE, MOOD DISTURBANCE, OR ANXIETY (MULTI): ICD-10-CM

## 2024-02-06 DIAGNOSIS — C61 MALIGNANT NEOPLASM OF PROSTATE (MULTI): Primary | ICD-10-CM

## 2024-02-06 DIAGNOSIS — F52.8 HYPERSEXUALITY: ICD-10-CM

## 2024-02-06 PROCEDURE — 99348 HOME/RES VST EST LOW MDM 30: CPT | Performed by: STUDENT IN AN ORGANIZED HEALTH CARE EDUCATION/TRAINING PROGRAM

## 2024-02-06 ASSESSMENT — ENCOUNTER SYMPTOMS
RESPIRATORY NEGATIVE: 1
CARDIOVASCULAR NEGATIVE: 1
PSYCHIATRIC NEGATIVE: 1
NEUROLOGICAL NEGATIVE: 1
MUSCULOSKELETAL NEGATIVE: 1
CONSTITUTIONAL NEGATIVE: 1
GASTROINTESTINAL NEGATIVE: 1

## 2024-02-07 NOTE — PROGRESS NOTES
Subjective   Patient ID: Mandy Wong is a 89 y.o. male.    Patient seen and examined at bedside.  Mood is overall stable, regards no acute complaints or concerns.  Is tolerating his medications.  States no additional issues or concerns at this time.        Review of Systems   Constitutional: Negative.    HENT: Negative.     Respiratory: Negative.     Cardiovascular: Negative.    Gastrointestinal: Negative.    Musculoskeletal: Negative.    Skin: Negative.    Neurological: Negative.    Psychiatric/Behavioral: Negative.         Objective Vitals Reviewed via facility EMR   Physical Exam  Constitutional:       General: He is not in acute distress.     Appearance: He is not ill-appearing.   Eyes:      Pupils: Pupils are equal, round, and reactive to light.   Cardiovascular:      Rate and Rhythm: Normal rate and regular rhythm.      Pulses: Normal pulses.      Heart sounds: No murmur heard.  Pulmonary:      Effort: No respiratory distress.      Breath sounds: No wheezing.   Abdominal:      General: Abdomen is flat. Bowel sounds are normal. There is no distension.   Musculoskeletal:      Right lower leg: No edema.      Left lower leg: No edema.   Skin:     General: Skin is warm and dry.   Neurological:      Mental Status: He is alert. Mental status is at baseline.      Cranial Nerves: No cranial nerve deficit.      Motor: No weakness.   Psychiatric:         Mood and Affect: Mood normal.         Behavior: Behavior normal.         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  Mood is overall stable.  Continue fall precautions.  Closely monitor mentation and overall mood, labs with wellness visit.  Continue supportive care.    Reviewed and approved by YING,  HARPREET ALEXIS on 2/6/24 at 10:32 PM.

## 2024-03-05 ENCOUNTER — NURSING HOME VISIT (OUTPATIENT)
Dept: POST ACUTE CARE | Facility: EXTERNAL LOCATION | Age: 89
End: 2024-03-05

## 2024-03-05 DIAGNOSIS — J06.9 UPPER RESPIRATORY TRACT INFECTION, UNSPECIFIED TYPE: Primary | ICD-10-CM

## 2024-03-05 DIAGNOSIS — G30.1 MODERATE LATE ONSET ALZHEIMER'S DEMENTIA WITHOUT BEHAVIORAL DISTURBANCE, PSYCHOTIC DISTURBANCE, MOOD DISTURBANCE, OR ANXIETY (MULTI): ICD-10-CM

## 2024-03-05 DIAGNOSIS — F02.B0 MODERATE LATE ONSET ALZHEIMER'S DEMENTIA WITHOUT BEHAVIORAL DISTURBANCE, PSYCHOTIC DISTURBANCE, MOOD DISTURBANCE, OR ANXIETY (MULTI): ICD-10-CM

## 2024-03-05 DIAGNOSIS — R41.81 AGE-RELATED COGNITIVE DECLINE: ICD-10-CM

## 2024-03-05 PROCEDURE — 99348 HOME/RES VST EST LOW MDM 30: CPT

## 2024-03-05 NOTE — ASSESSMENT & PLAN NOTE
Patient continues to be stable and will continue current treatments and monitor for mental status

## 2024-03-05 NOTE — ASSESSMENT & PLAN NOTE
-Doing well today and he himself does not endorse any concerns, but wife states he is having difficulty breathing  -Participates and cooperates in examination  -Is looking forward to gardening and putting away his tracy decorations

## 2024-03-05 NOTE — ASSESSMENT & PLAN NOTE
-Ssx started on Saturday/Sunday, so he is on day 4 of symptoms  -Given high risk, we will start 5 day course of Doxycycline 500mg for upper respiratory infection  -Symptomatic relief of cough/congestion with mucinex 600mg twice daily as needed  -Will get CXR as well since patient is not able to describe severity of symptoms  -Encourage fluid hydration as much as tolerated to help support healing

## 2024-03-05 NOTE — PROGRESS NOTES
Visit  Note   Subjective   Kelechi Wong is a 89 y.o. male who is being seen and evaluated for multiple medical problems. Nursing notes, vital signs, and labs were reviewed in the local facility chart.    HPI     Kristian Wong is an 89yoM with PMHx significant for age related cognitive decline, moderate onset Alzheimer's dementia without behavioral disturbance. He was being seen today for complaints of increased work of breathing. He denies any of these complaints, so symptoms/hx were supplemented by wife in room. Yesterday he was having increased work of breathing and using accessory muscles to breathe; he has also been having a productive cough and some congestion in his sinuses. Wife states that he has had poor PO intake over the last couple days and is mainly drinking liquids instead of eating foods. He has been having regular bowel movements and no changes in his urination that his wife notices. She endorses becoming ill with similar symptoms shortly after him.       Objective   There were no vitals taken for this visit.  Physical Exam  Vitals reviewed.   Constitutional:       General: He is not in acute distress.     Appearance: Normal appearance. He is not ill-appearing.   HENT:      Head: Normocephalic and atraumatic.      Right Ear: External ear normal.      Left Ear: External ear normal.      Nose: Congestion present. No rhinorrhea.      Mouth/Throat:      Mouth: Mucous membranes are dry.   Eyes:      Extraocular Movements: Extraocular movements intact.      Conjunctiva/sclera: Conjunctivae normal.   Cardiovascular:      Rate and Rhythm: Normal rate and regular rhythm.      Pulses: Normal pulses.      Heart sounds: Normal heart sounds.   Pulmonary:      Effort: Pulmonary effort is normal. No respiratory distress.      Breath sounds: Normal breath sounds. No wheezing or rales.   Musculoskeletal:      Cervical back: Normal range of motion.      Right lower leg: No edema.      Left lower leg: No edema.   Skin:      General: Skin is warm and dry.   Neurological:      General: No focal deficit present.      Mental Status: He is alert. Mental status is at baseline.   Psychiatric:         Mood and Affect: Mood normal.         Behavior: Behavior normal.         Assessment/Plan     Meds, orders, nursing notes reviewed.  Nurse will monitor and update physician of any change: Discussed with patient and provided information.  Supportive care will be given.  Follow-up in 1 month or earlier as needed.     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 behavioral disturbance, psychotic disturbance, mood disturbance, or anxiety (CMS/HCC)     Patient continues to be stable and will continue current treatments and monitor for mental status          Upper respiratory infection - Primary     -Ssx started on Saturday/Sunday, so he is on day 4 of symptoms  -Given high risk, we will start 5 day course of Doxycycline 500mg for upper respiratory infection  -Symptomatic relief of cough/congestion with mucinex 600mg twice daily as needed  -Will get CXR as well since patient is not able to describe severity of symptoms  -Encourage fluid hydration as much as tolerated to help support healing             Beatrice Dixon DO  Family Medicine Resident, PGY-1     Pt evaluated, continue on abx, monitor mentation and mood ambulation dysfunction, obtain chest x-ray due to congestion, continue supportive care    Reviewed and approved by HARPREET HE on 3/7/24 at 10:59 PM.

## 2024-03-08 NOTE — PROGRESS NOTES
I saw and evaluated the patient. I personally obtained the key and critical portions of the history and physical exam or was physically present for key and critical portions performed by the resident/fellow. I reviewed the resident/fellow's documentation and discussed the patient with the resident/fellow. I agree with the resident/fellow's medical decision making as documented in the note.    Yonatan Blood, DO

## 2024-03-19 ENCOUNTER — NURSING HOME VISIT (OUTPATIENT)
Dept: POST ACUTE CARE | Facility: EXTERNAL LOCATION | Age: 89
End: 2024-03-19

## 2024-03-19 DIAGNOSIS — I10 PRIMARY HYPERTENSION: Primary | ICD-10-CM

## 2024-03-19 DIAGNOSIS — R41.81 AGE-RELATED COGNITIVE DECLINE: ICD-10-CM

## 2024-03-19 DIAGNOSIS — J06.9 UPPER RESPIRATORY TRACT INFECTION, UNSPECIFIED TYPE: ICD-10-CM

## 2024-03-19 DIAGNOSIS — G30.1 MODERATE LATE ONSET ALZHEIMER'S DEMENTIA WITHOUT BEHAVIORAL DISTURBANCE, PSYCHOTIC DISTURBANCE, MOOD DISTURBANCE, OR ANXIETY (MULTI): ICD-10-CM

## 2024-03-19 DIAGNOSIS — F02.B0 MODERATE LATE ONSET ALZHEIMER'S DEMENTIA WITHOUT BEHAVIORAL DISTURBANCE, PSYCHOTIC DISTURBANCE, MOOD DISTURBANCE, OR ANXIETY (MULTI): ICD-10-CM

## 2024-03-19 PROCEDURE — 99348 HOME/RES VST EST LOW MDM 30: CPT | Performed by: STUDENT IN AN ORGANIZED HEALTH CARE EDUCATION/TRAINING PROGRAM

## 2024-03-21 ASSESSMENT — ENCOUNTER SYMPTOMS
GASTROINTESTINAL NEGATIVE: 1
CARDIOVASCULAR NEGATIVE: 1
CONSTITUTIONAL NEGATIVE: 1
NEUROLOGICAL NEGATIVE: 1
COUGH: 1
MUSCULOSKELETAL NEGATIVE: 1
PSYCHIATRIC NEGATIVE: 1

## 2024-03-22 NOTE — PROGRESS NOTES
Subjective   Patient ID: Kelechi Wong is a 89 y.o. male.    Patient seen and examined at bedside.  Regards that he is overall doing well without many concerns.  Recently got an upper respiratory infection as well very similar to his wife but is improving each and every day.  Otherwise states that his strength is coming back and states no acute issues or concerns.        Review of Systems   Constitutional: Negative.    HENT: Negative.     Respiratory:  Positive for cough.    Cardiovascular: Negative.    Gastrointestinal: Negative.    Musculoskeletal: Negative.    Skin: Negative.    Neurological: Negative.    Psychiatric/Behavioral: Negative.         Objective  Vitals Reviewed via facility EMR   Physical Exam  Constitutional:       General: He is not in acute distress.     Appearance: He is not ill-appearing.   Eyes:      Pupils: Pupils are equal, round, and reactive to light.   Cardiovascular:      Rate and Rhythm: Normal rate and regular rhythm.      Pulses: Normal pulses.      Heart sounds: No murmur heard.  Pulmonary:      Effort: No respiratory distress.      Breath sounds: No wheezing.   Abdominal:      General: Abdomen is flat. Bowel sounds are normal. There is no distension.   Musculoskeletal:      Right lower leg: No edema.      Left lower leg: No edema.   Skin:     General: Skin is warm and dry.   Neurological:      Mental Status: He is alert. Mental status is at baseline.      Cranial Nerves: No cranial nerve deficit.      Motor: No weakness.   Psychiatric:         Mood and Affect: Mood normal.         Behavior: Behavior normal.         Assessment/Plan   Diagnoses and all orders for this visit:  Primary hypertension  Upper respiratory tract infection, unspecified type  Age-related cognitive decline  Moderate late onset Alzheimer's dementia without behavioral disturbance, psychotic disturbance, mood disturbance, or anxiety (CMS/[facility])  Patient seen and examined.  Overall medically stable.  Likely recovering  from upper respiratory infection and pneumonia.  Continue to monitor respiratory status and vitals.  No changes in medications or diet at this time.  Will closely monitor respiratory status and vitals.  obtain labs at next visit.  Continue fall precautions.  Consider PT OT.    Reviewed and approved by HARPREET HE on 3/21/24 at 9:57 PM.

## 2024-06-04 ENCOUNTER — NURSING HOME VISIT (OUTPATIENT)
Dept: POST ACUTE CARE | Facility: EXTERNAL LOCATION | Age: 89
End: 2024-06-04

## 2024-06-04 DIAGNOSIS — R41.81 AGE-RELATED COGNITIVE DECLINE: ICD-10-CM

## 2024-06-04 DIAGNOSIS — J06.9 UPPER RESPIRATORY TRACT INFECTION, UNSPECIFIED TYPE: Primary | ICD-10-CM

## 2024-06-04 PROCEDURE — 99349 HOME/RES VST EST MOD MDM 40: CPT

## 2024-06-04 NOTE — PROGRESS NOTES
Subjective   Patient ID: Kelechi Wong is a 89 y.o. male who presents for No chief complaint on file..  Sinew seen evaluated for a routine nursing home visit.  His wife was present during exam.  States that earlier this week he was having shortness of breath with increased work of breathing.  Over the past several days he is improved.  No increased work of breathing when walking around.  Tolerated PT today without feeling short of breath, no chest pain, no lightheadedness, no increased exertional breathing, can lay flat.  No leg swelling, no dizziness or lightheadedness.  His wife states that he is looking significantly better.  He does not endorse any pain and says that he is having any shortness of breath.  Still on the antibiotics as he is finishing his course.  No other questions or concerns at this time and states that he is doing well, will continue with symptomatic management but otherwise feels he is nearly completely resolved from his upper respiratory infection.  Okay to continue with palliative care at this time.        Review of Systems    Objective   Physical Exam  Constitutional:       General: He is not in acute distress.  HENT:      Head: Normocephalic and atraumatic.      Nose: Nose normal.      Mouth/Throat:      Mouth: Mucous membranes are moist.      Pharynx: Oropharynx is clear.   Neck:      Vascular: No JVD.   Cardiovascular:      Rate and Rhythm: Normal rate and regular rhythm.      Pulses: Normal pulses.   Pulmonary:      Effort: Pulmonary effort is normal. No respiratory distress.      Breath sounds: No wheezing, rhonchi or rales.   Musculoskeletal:      Right lower leg: No edema.      Left lower leg: No edema.   Skin:     General: Skin is warm and dry.   Neurological:      General: No focal deficit present.      Mental Status: He is alert.   Psychiatric:         Mood and Affect: Mood normal.         Behavior: Behavior normal.         Assessment/Plan   Problem List Items Addressed This Visit              ICD-10-CM    Age-related cognitive decline R41.81    Upper respiratory infection - Primary J06.9     Patient was seen and evaluated.  On exam lungs are clear to auscultation bilaterally, tolerates walking around the room without dyspnea.  Conversational without any conversational dyspnea.  Remainder of exam as above.  Patient likely had an upper respiratory infection that is improving well.  Discussed expected course of recovery and warning signs to look out for but anticipate patient will recover back to his baseline and we will continue with palliative care as needed.         Dipesh Freitas,  06/04/24 4:14 PM     Pt seen with resident physician, appreciate pallative consult, monitor sympotms continue supportive care, has been improving from abx    Reviewed and approved by HARPREET HE on 6/5/24 at 9:26 PM.

## 2024-06-06 NOTE — PROGRESS NOTES
I reviewed the resident/fellow's documentation and discussed the patient with the resident/fellow. I agree with the resident/fellow's medical decision making as documented in the note.         Pt seen with resident physician, appreciate pallative consult, monitor sympotms continue supportive care, has been improving from abx    Reviewed and approved by YONATAN HE on 6/5/24 at 9:26 PM.     Yonatan He, DO

## 2024-06-11 ENCOUNTER — NURSING HOME VISIT (OUTPATIENT)
Dept: POST ACUTE CARE | Facility: EXTERNAL LOCATION | Age: 89
End: 2024-06-11

## 2024-06-11 DIAGNOSIS — I10 PRIMARY HYPERTENSION: Primary | ICD-10-CM

## 2024-06-11 DIAGNOSIS — I50.33 ACUTE ON CHRONIC HEART FAILURE WITH PRESERVED EJECTION FRACTION (MULTI): ICD-10-CM

## 2024-06-11 DIAGNOSIS — E78.49 OTHER HYPERLIPIDEMIA: ICD-10-CM

## 2024-06-11 DIAGNOSIS — E87.70 HYPERVOLEMIA, UNSPECIFIED HYPERVOLEMIA TYPE: ICD-10-CM

## 2024-06-11 DIAGNOSIS — F02.B0 MODERATE LATE ONSET ALZHEIMER'S DEMENTIA WITHOUT BEHAVIORAL DISTURBANCE, PSYCHOTIC DISTURBANCE, MOOD DISTURBANCE, OR ANXIETY (MULTI): ICD-10-CM

## 2024-06-11 DIAGNOSIS — G30.1 MODERATE LATE ONSET ALZHEIMER'S DEMENTIA WITHOUT BEHAVIORAL DISTURBANCE, PSYCHOTIC DISTURBANCE, MOOD DISTURBANCE, OR ANXIETY (MULTI): ICD-10-CM

## 2024-06-11 PROCEDURE — 99349 HOME/RES VST EST MOD MDM 40: CPT | Performed by: STUDENT IN AN ORGANIZED HEALTH CARE EDUCATION/TRAINING PROGRAM

## 2024-06-14 PROBLEM — I50.33 ACUTE ON CHRONIC HEART FAILURE WITH PRESERVED EJECTION FRACTION (MULTI): Status: ACTIVE | Noted: 2024-06-14

## 2024-06-14 PROBLEM — E87.70 FLUID OVERLOAD: Status: ACTIVE | Noted: 2024-06-14

## 2024-06-14 ASSESSMENT — ENCOUNTER SYMPTOMS
ABDOMINAL DISTENTION: 1
PSYCHIATRIC NEGATIVE: 1
WEAKNESS: 1
MUSCULOSKELETAL NEGATIVE: 1
SHORTNESS OF BREATH: 1
FATIGUE: 1

## 2024-06-15 NOTE — PROGRESS NOTES
Subjective   Patient ID: Kelechi Wong is a 89 y.o. male.    Patient seen and examined at the request of the family and POA.  They regard that the patient has been having some issues and difficulty with breathing.  States that the patient continues to have episodes where he gets significantly short of breath and starts almost pursed lip breathing.  This has been slightly improved recently, however, seem to worsen after course of antibiotics.  Patient endorses that he also has lack of appetite, and feels more weaker than usual.  Weight has been somewhat stable.  Otherwise, endorses no additional issues or concerns.        Review of Systems   Constitutional:  Positive for fatigue.   HENT: Negative.     Respiratory:  Positive for shortness of breath.    Cardiovascular:  Positive for leg swelling.   Gastrointestinal:  Positive for abdominal distention.   Musculoskeletal: Negative.    Skin: Negative.    Neurological:  Positive for weakness.   Psychiatric/Behavioral: Negative.         Objective Vitals Reviewed via facility EMR   Physical Exam  Constitutional:       General: He is not in acute distress.     Appearance: He is not ill-appearing.   HENT:      Head:      Comments: Elevated JVP  Eyes:      Pupils: Pupils are equal, round, and reactive to light.   Cardiovascular:      Rate and Rhythm: Normal rate and regular rhythm.      Pulses: Normal pulses.      Heart sounds: No murmur heard.  Pulmonary:      Effort: No respiratory distress.      Breath sounds: No wheezing.   Abdominal:      General: Abdomen is flat. Bowel sounds are normal. There is no distension.   Musculoskeletal:      Right lower leg: Edema present.      Left lower leg: Edema present.   Skin:     General: Skin is warm and dry.   Neurological:      Mental Status: He is alert. Mental status is at baseline.      Cranial Nerves: No cranial nerve deficit.      Motor: Weakness present.   Psychiatric:         Mood and Affect: Mood normal.         Behavior:  Behavior normal.         Assessment/Plan   Diagnoses and all orders for this visit:  Primary hypertension  Other hyperlipidemia  Moderate late onset Alzheimer's dementia without behavioral disturbance, psychotic disturbance, mood disturbance, or anxiety (Multi)  Hypervolemia, unspecified hypervolemia type  Acute on chronic heart failure with preserved ejection fraction (Multi)      Patient seen and examined at bedside.  Low suspicion for infectious or upper respiratory infection type of etiology causing patient's issues.  More suspicion for fluid overload such as HFpEF causing patient's underlying issues.  Will initiate Lasix 20 mg and closely monitor.  Obtain chest x-ray, CBC, CMP, BNP to further evaluate.  Discussed obtaining echo however family defers at this time would want to wait for lab work.  This potentially also could be a progression of patient's underlying disease and dementia.  If workup unremarkable, suspect that underlying dementia and advanced age are the underlying reason for the patient's current symptoms.  Otherwise, we will closely monitor.  Continue supportive care.  Family updated at bedside and is agreeable.  Will optimize electrolytes.    Reviewed and approved by HARPREET HE on 6/14/24 at 8:50 PM.

## 2024-06-25 ENCOUNTER — NURSING HOME VISIT (OUTPATIENT)
Dept: POST ACUTE CARE | Facility: EXTERNAL LOCATION | Age: 89
End: 2024-06-25

## 2024-06-25 DIAGNOSIS — I50.33 ACUTE ON CHRONIC HEART FAILURE WITH PRESERVED EJECTION FRACTION (MULTI): Primary | ICD-10-CM

## 2024-06-25 DIAGNOSIS — I10 PRIMARY HYPERTENSION: ICD-10-CM

## 2024-06-25 DIAGNOSIS — C61 MALIGNANT NEOPLASM OF PROSTATE (MULTI): ICD-10-CM

## 2024-06-25 DIAGNOSIS — E87.70 HYPERVOLEMIA, UNSPECIFIED HYPERVOLEMIA TYPE: ICD-10-CM

## 2024-06-25 PROCEDURE — 99348 HOME/RES VST EST LOW MDM 30: CPT | Performed by: STUDENT IN AN ORGANIZED HEALTH CARE EDUCATION/TRAINING PROGRAM

## 2024-06-27 ASSESSMENT — ENCOUNTER SYMPTOMS
SHORTNESS OF BREATH: 1
WEAKNESS: 1
CARDIOVASCULAR NEGATIVE: 1
CONSTITUTIONAL NEGATIVE: 1
PSYCHIATRIC NEGATIVE: 1
MUSCULOSKELETAL NEGATIVE: 1
GASTROINTESTINAL NEGATIVE: 1

## 2024-06-28 NOTE — PROGRESS NOTES
Subjective   Patient ID: Kelechi Wong is a 89 y.o. male.    Patient seen and examined at bedside.  Regards that he is overall doing well and endorses that the initiation of Lasix did help with his underlying symptoms.  States that he can ambulate better and does not feel as short of breath as often.  Still gets dyspnea on exertion but regards that his swelling in his lower extremities has improved.  Otherwise, endorses no additional issues or concerns.  Wife also reports no issues.        Review of Systems   Constitutional: Negative.    HENT: Negative.     Respiratory:  Positive for shortness of breath.    Cardiovascular: Negative.    Gastrointestinal: Negative.    Musculoskeletal: Negative.    Skin: Negative.    Neurological:  Positive for weakness.   Psychiatric/Behavioral: Negative.         Objective Vitals Reviewed via facility EMR   Physical Exam  Constitutional:       General: He is not in acute distress.     Appearance: He is not ill-appearing.   Eyes:      Pupils: Pupils are equal, round, and reactive to light.   Cardiovascular:      Rate and Rhythm: Normal rate and regular rhythm.      Pulses: Normal pulses.      Heart sounds: No murmur heard.  Pulmonary:      Effort: No respiratory distress.      Breath sounds: No wheezing.      Comments: Mild SOB on exertion  Abdominal:      General: Abdomen is flat. Bowel sounds are normal. There is no distension.   Musculoskeletal:      Right lower leg: No edema.      Left lower leg: No edema.   Skin:     General: Skin is warm and dry.   Neurological:      Mental Status: He is alert. Mental status is at baseline.      Cranial Nerves: No cranial nerve deficit.   Psychiatric:         Mood and Affect: Mood normal.         Behavior: Behavior normal.         Assessment/Plan   Diagnoses and all orders for this visit:  Acute on chronic heart failure with preserved ejection fraction (Multi)  Hypervolemia, unspecified hypervolemia type  Malignant neoplasm of prostate  (Multi)  Primary hypertension  Patient seen and examined.  Overall clinically improved after initiation of Lasix.  Due to persistent symptoms, we will also titrate up Lasix to 40 mg daily.  Continue to closely monitor symptoms.  Fall precautions.  Obtain lab work in 1 month to further evaluate electrolytes and kidney function.    Reviewed and approved by HARPREET HE on 6/27/24 at 10:31 PM.

## 2024-07-23 ENCOUNTER — NURSING HOME VISIT (OUTPATIENT)
Dept: POST ACUTE CARE | Facility: EXTERNAL LOCATION | Age: 89
End: 2024-07-23

## 2024-07-23 DIAGNOSIS — I50.33 ACUTE ON CHRONIC HEART FAILURE WITH PRESERVED EJECTION FRACTION (MULTI): Primary | ICD-10-CM

## 2024-07-23 DIAGNOSIS — J18.9 PNEUMONIA OF BOTH LUNGS DUE TO INFECTIOUS ORGANISM, UNSPECIFIED PART OF LUNG: ICD-10-CM

## 2024-07-23 DIAGNOSIS — R41.81 AGE-RELATED COGNITIVE DECLINE: ICD-10-CM

## 2024-07-23 DIAGNOSIS — E87.70 HYPERVOLEMIA, UNSPECIFIED HYPERVOLEMIA TYPE: ICD-10-CM

## 2024-07-23 DIAGNOSIS — I10 PRIMARY HYPERTENSION: ICD-10-CM

## 2024-07-23 PROCEDURE — 99349 HOME/RES VST EST MOD MDM 40: CPT | Performed by: STUDENT IN AN ORGANIZED HEALTH CARE EDUCATION/TRAINING PROGRAM

## 2024-07-24 PROBLEM — J18.9 PNEUMONIA OF BOTH LUNGS DUE TO INFECTIOUS ORGANISM: Status: ACTIVE | Noted: 2024-07-24

## 2024-07-24 ASSESSMENT — ENCOUNTER SYMPTOMS
MUSCULOSKELETAL NEGATIVE: 1
CONSTITUTIONAL NEGATIVE: 1
WEAKNESS: 1
WHEEZING: 1
SHORTNESS OF BREATH: 1
COUGH: 1
CARDIOVASCULAR NEGATIVE: 1
GASTROINTESTINAL NEGATIVE: 1
PSYCHIATRIC NEGATIVE: 1

## 2024-07-25 NOTE — PROGRESS NOTES
Subjective   Patient ID: Kelechi Wong is a 89 y.o. male.    Patient seen for evaluation.  Recently had a chest event that showed potential infiltrates bilateral lung was started on antibiotic, wife regards that patient is slowly improving however still having issues with his breathing.  Is unable to lay down flat secondary to his breathing issues.  Feels like he is more swollen as of late, and is retaining more fluid.  Otherwise, states no fevers chills or constitutional symptoms.  Denies any additional issues or concerns at this time.        Review of Systems   Constitutional: Negative.    HENT: Negative.     Respiratory:  Positive for cough, shortness of breath and wheezing.    Cardiovascular: Negative.    Gastrointestinal: Negative.    Musculoskeletal: Negative.    Skin: Negative.    Neurological:  Positive for weakness.   Psychiatric/Behavioral: Negative.         Objective   Vitals Reviewed via facility EMR   Physical Exam  Constitutional:       General: He is not in acute distress.     Appearance: He is not ill-appearing.   Eyes:      Pupils: Pupils are equal, round, and reactive to light.   Cardiovascular:      Rate and Rhythm: Normal rate and regular rhythm.      Pulses: Normal pulses.      Heart sounds: No murmur heard.  Pulmonary:      Effort: No respiratory distress.      Breath sounds: No wheezing.      Comments: Dec breath sounds  Abdominal:      General: Abdomen is flat. Bowel sounds are normal. There is no distension.   Musculoskeletal:      Right lower leg: Edema present.      Left lower leg: Edema present.   Skin:     General: Skin is warm and dry.   Neurological:      Mental Status: He is alert. Mental status is at baseline.      Cranial Nerves: No cranial nerve deficit.      Motor: Weakness present.   Psychiatric:         Mood and Affect: Mood normal.         Behavior: Behavior normal.         Assessment/Plan   Diagnoses and all orders for this visit:  Acute on chronic heart failure with preserved  ejection fraction (Multi)  Hypervolemia, unspecified hypervolemia type  Primary hypertension  Age-related cognitive decline  Pneumonia of both lungs due to infectious organism, unspecified part of lung  Patient seen and examined.  Being treated for underlying pneumonia at this time however still slightly congested and fluid overloaded.  Recommend titration patient off of his Lasix as I do believe he is retaining a bit more fluid.  Continue supportive care.  Closely monitor vitals and obtain labs next week.  Otherwise, no additional issues at this time.  Discussed care plan with wife who is agreeable.    Reviewed and approved by HARPREET HE on 7/24/24 at 11:12 PM.

## 2024-09-10 ENCOUNTER — NURSING HOME VISIT (OUTPATIENT)
Dept: POST ACUTE CARE | Facility: EXTERNAL LOCATION | Age: 89
End: 2024-09-10

## 2024-09-10 DIAGNOSIS — I10 PRIMARY HYPERTENSION: Primary | ICD-10-CM

## 2024-09-10 DIAGNOSIS — F02.B0 MODERATE LATE ONSET ALZHEIMER'S DEMENTIA WITHOUT BEHAVIORAL DISTURBANCE, PSYCHOTIC DISTURBANCE, MOOD DISTURBANCE, OR ANXIETY (MULTI): ICD-10-CM

## 2024-09-10 DIAGNOSIS — E78.49 OTHER HYPERLIPIDEMIA: ICD-10-CM

## 2024-09-10 DIAGNOSIS — I50.33 ACUTE ON CHRONIC HEART FAILURE WITH PRESERVED EJECTION FRACTION (MULTI): ICD-10-CM

## 2024-09-10 DIAGNOSIS — Z51.5 HOSPICE CARE PATIENT: ICD-10-CM

## 2024-09-10 DIAGNOSIS — J06.9 UPPER RESPIRATORY TRACT INFECTION, UNSPECIFIED TYPE: ICD-10-CM

## 2024-09-10 DIAGNOSIS — G30.1 MODERATE LATE ONSET ALZHEIMER'S DEMENTIA WITHOUT BEHAVIORAL DISTURBANCE, PSYCHOTIC DISTURBANCE, MOOD DISTURBANCE, OR ANXIETY (MULTI): ICD-10-CM

## 2024-09-10 PROCEDURE — 99348 HOME/RES VST EST LOW MDM 30: CPT | Performed by: STUDENT IN AN ORGANIZED HEALTH CARE EDUCATION/TRAINING PROGRAM

## 2024-09-12 PROBLEM — Z51.5 HOSPICE CARE PATIENT: Status: ACTIVE | Noted: 2024-09-12

## 2024-09-12 ASSESSMENT — ENCOUNTER SYMPTOMS
SHORTNESS OF BREATH: 1
CONSTITUTIONAL NEGATIVE: 1
WEAKNESS: 1
CARDIOVASCULAR NEGATIVE: 1
MUSCULOSKELETAL NEGATIVE: 1
PSYCHIATRIC NEGATIVE: 1
GASTROINTESTINAL NEGATIVE: 1

## 2024-09-13 NOTE — PROGRESS NOTES
Subjective   Patient ID: Kelechi Wong is a 89 y.o. male.    Patient seen and examined at bedside.  Regards that he is overall doing well, intermittently having issues with his breathing, however it is overall much improved.  Otherwise, states that he is tolerating his medications.  Is currently admitted under hospice, and regards that his symptoms are overall well-controlled.  Otherwise states no additional issues or concerns.        Review of Systems   Constitutional: Negative.    HENT: Negative.     Respiratory:  Positive for shortness of breath.    Cardiovascular: Negative.    Gastrointestinal: Negative.    Musculoskeletal: Negative.    Skin: Negative.    Neurological:  Positive for weakness.   Psychiatric/Behavioral: Negative.         Objective Vitals Reviewed via facility EMR   Physical Exam  Constitutional:       General: He is not in acute distress.     Appearance: He is not ill-appearing.      Comments: Elderly male   Eyes:      Pupils: Pupils are equal, round, and reactive to light.   Cardiovascular:      Rate and Rhythm: Normal rate and regular rhythm.      Pulses: Normal pulses.      Heart sounds: No murmur heard.  Pulmonary:      Effort: No respiratory distress.      Breath sounds: No wheezing.   Abdominal:      General: Abdomen is flat. Bowel sounds are normal. There is no distension.   Musculoskeletal:      Right lower leg: No edema.      Left lower leg: No edema.   Skin:     General: Skin is warm and dry.   Neurological:      Mental Status: He is alert. Mental status is at baseline.      Cranial Nerves: No cranial nerve deficit.      Motor: Weakness present.   Psychiatric:         Mood and Affect: Mood normal.         Behavior: Behavior normal.         Assessment/Plan   Diagnoses and all orders for this visit:  Primary hypertension  Upper respiratory tract infection, unspecified type  Moderate late onset Alzheimer's dementia without behavioral disturbance, psychotic disturbance, mood disturbance, or  anxiety (Multi)  Other hyperlipidemia      Patient seen and examined at bedside.  Overall medically stable, appreciate hospice recommendations, closely monitor symptoms of shortness of breath especially.  Otherwise, continue supportive care.  No additional issues at this time.    Reviewed and approved by HARPREET HE on 9/12/24 at 10:09 PM.

## 2024-09-24 ENCOUNTER — NURSING HOME VISIT (OUTPATIENT)
Dept: POST ACUTE CARE | Facility: EXTERNAL LOCATION | Age: 89
End: 2024-09-24

## 2024-09-24 DIAGNOSIS — I10 PRIMARY HYPERTENSION: Primary | ICD-10-CM

## 2024-09-24 DIAGNOSIS — Z51.5 HOSPICE CARE PATIENT: ICD-10-CM

## 2024-09-24 DIAGNOSIS — F02.B0 MODERATE LATE ONSET ALZHEIMER'S DEMENTIA WITHOUT BEHAVIORAL DISTURBANCE, PSYCHOTIC DISTURBANCE, MOOD DISTURBANCE, OR ANXIETY (MULTI): ICD-10-CM

## 2024-09-24 DIAGNOSIS — G30.1 MODERATE LATE ONSET ALZHEIMER'S DEMENTIA WITHOUT BEHAVIORAL DISTURBANCE, PSYCHOTIC DISTURBANCE, MOOD DISTURBANCE, OR ANXIETY (MULTI): ICD-10-CM

## 2024-09-24 DIAGNOSIS — E44.1 MILD PROTEIN-CALORIE MALNUTRITION (MULTI): ICD-10-CM

## 2024-09-24 DIAGNOSIS — R63.4 WEIGHT LOSS: ICD-10-CM

## 2024-09-24 PROCEDURE — 99349 HOME/RES VST EST MOD MDM 40: CPT | Performed by: STUDENT IN AN ORGANIZED HEALTH CARE EDUCATION/TRAINING PROGRAM

## 2024-09-25 PROBLEM — R63.4 WEIGHT LOSS: Status: ACTIVE | Noted: 2024-09-25

## 2024-09-25 PROBLEM — E44.1 MILD PROTEIN-CALORIE MALNUTRITION (MULTI): Status: ACTIVE | Noted: 2024-09-25

## 2024-09-25 ASSESSMENT — ENCOUNTER SYMPTOMS
FATIGUE: 1
PSYCHIATRIC NEGATIVE: 1
GASTROINTESTINAL NEGATIVE: 1
RESPIRATORY NEGATIVE: 1
MUSCULOSKELETAL NEGATIVE: 1
WEAKNESS: 1
CARDIOVASCULAR NEGATIVE: 1

## 2024-09-26 NOTE — PROGRESS NOTES
Subjective   Patient ID: Kelechi Wong is a 89 y.o. male.    Patient seen and evaluated bedside.  Regards that he is overall doing well, however family endorses that the patient has been having issues with appetite.  Has not been eating as much as he usually does and secondary to this, has had significant weight loss.  Recently was started on Remeron, however only 7.5 dosing and family has not noticed a difference.  Otherwise, regards that he is overall doing well without any issues.        Review of Systems   Constitutional:  Positive for fatigue.        Weight loss   HENT: Negative.     Respiratory: Negative.     Cardiovascular: Negative.    Gastrointestinal: Negative.    Musculoskeletal: Negative.    Skin: Negative.    Neurological:  Positive for weakness.   Psychiatric/Behavioral: Negative.         Vitals Reviewed via facility EMR   Physical Exam  Constitutional:       General: He is not in acute distress.     Appearance: He is not ill-appearing.      Comments: Elderly male, plesaant   Eyes:      Pupils: Pupils are equal, round, and reactive to light.   Cardiovascular:      Rate and Rhythm: Normal rate and regular rhythm.      Pulses: Normal pulses.      Heart sounds: No murmur heard.  Pulmonary:      Effort: No respiratory distress.      Breath sounds: No wheezing.   Abdominal:      General: Abdomen is flat. Bowel sounds are normal. There is no distension.   Musculoskeletal:      Right lower leg: No edema.      Left lower leg: No edema.   Skin:     General: Skin is warm and dry.   Neurological:      Mental Status: He is alert. Mental status is at baseline.      Cranial Nerves: No cranial nerve deficit.      Motor: Weakness present.   Psychiatric:         Mood and Affect: Mood normal.         Behavior: Behavior normal.         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  Overall medically stable, is currently admitted under hospice care.  Due to recent issues with malnutrition, we will titrate up Remeron to 15 mg and then 30 mg.  Closely monitor weights, appreciate hospice recommendations as I suspect some of this is likely secondary to underlying disease process.  No additional issues at this time.  Continue supportive care    Reviewed and approved by HARPREET HE on 9/25/24 at 10:53 PM.

## 2024-11-19 ENCOUNTER — NURSING HOME VISIT (OUTPATIENT)
Dept: POST ACUTE CARE | Facility: EXTERNAL LOCATION | Age: 89
End: 2024-11-19

## 2024-11-19 DIAGNOSIS — I50.33 ACUTE ON CHRONIC HEART FAILURE WITH PRESERVED EJECTION FRACTION: ICD-10-CM

## 2024-11-19 DIAGNOSIS — I10 PRIMARY HYPERTENSION: ICD-10-CM

## 2024-11-19 DIAGNOSIS — F02.B0 MODERATE LATE ONSET ALZHEIMER'S DEMENTIA WITHOUT BEHAVIORAL DISTURBANCE, PSYCHOTIC DISTURBANCE, MOOD DISTURBANCE, OR ANXIETY (MULTI): ICD-10-CM

## 2024-11-19 DIAGNOSIS — Z51.5 HOSPICE CARE PATIENT: Primary | ICD-10-CM

## 2024-11-19 DIAGNOSIS — G30.1 MODERATE LATE ONSET ALZHEIMER'S DEMENTIA WITHOUT BEHAVIORAL DISTURBANCE, PSYCHOTIC DISTURBANCE, MOOD DISTURBANCE, OR ANXIETY (MULTI): ICD-10-CM

## 2024-11-19 DIAGNOSIS — E44.1 MILD PROTEIN-CALORIE MALNUTRITION (MULTI): ICD-10-CM

## 2024-11-19 PROCEDURE — 99348 HOME/RES VST EST LOW MDM 30: CPT | Performed by: STUDENT IN AN ORGANIZED HEALTH CARE EDUCATION/TRAINING PROGRAM

## 2024-11-21 ASSESSMENT — ENCOUNTER SYMPTOMS
FATIGUE: 1
WEAKNESS: 1
GASTROINTESTINAL NEGATIVE: 1
COUGH: 1
MUSCULOSKELETAL NEGATIVE: 1
PSYCHIATRIC NEGATIVE: 1

## 2024-11-22 NOTE — PROGRESS NOTES
Subjective   Patient ID: Kelechi Wong is a 89 y.o. male.    Patient seen and examined at bedside.  Regards that he is overall doing well, symptoms are overall stable.  Hospice is also present evaluation patient, and states that patient is overall stable as well.  Does intermittently get shortness of breath, however this is chronic for him.  Otherwise, continue supportive care.  No additional issues at this time.        Review of Systems   Constitutional:  Positive for fatigue.   HENT: Negative.     Respiratory:  Positive for cough.    Cardiovascular:  Positive for leg swelling.   Gastrointestinal: Negative.    Musculoskeletal: Negative.    Skin: Negative.    Neurological:  Positive for weakness.   Psychiatric/Behavioral: Negative.         Objective Vitals Reviewed via facility EMR   Physical Exam  Constitutional:       General: He is not in acute distress.     Appearance: He is not ill-appearing.   Eyes:      Pupils: Pupils are equal, round, and reactive to light.   Cardiovascular:      Rate and Rhythm: Normal rate and regular rhythm.      Pulses: Normal pulses.      Heart sounds: No murmur heard.  Pulmonary:      Effort: No respiratory distress.      Breath sounds: No wheezing.   Abdominal:      General: Abdomen is flat. Bowel sounds are normal. There is no distension.   Musculoskeletal:      Right lower leg: No edema.      Left lower leg: No edema.   Skin:     General: Skin is warm and dry.   Neurological:      Mental Status: He is alert. Mental status is at baseline.      Cranial Nerves: No cranial nerve deficit.      Motor: Weakness present.   Psychiatric:         Mood and Affect: Mood normal.         Behavior: Behavior normal.         Assessment/Plan   Diagnoses and all orders for this visit:  Hospice care patient  Acute on chronic heart failure with preserved ejection fraction  Primary hypertension  Mild protein-calorie malnutrition (Multi)  Moderate late onset Alzheimer's dementia without behavioral  disturbance, psychotic disturbance, mood disturbance, or anxiety (Multi)    Patient seen and examined at bedside.  Appreciate hospice recommendations, continue supportive care.  Otherwise, no additional issues at this time.  Continue supportive care.  Overall euvolemic at this time however advised adjustment of his diuretics as needed.    Reviewed and approved by HARPREET HE on 11/21/24 at 10:30 PM.

## 2025-01-07 ENCOUNTER — NURSING HOME VISIT (OUTPATIENT)
Dept: POST ACUTE CARE | Facility: EXTERNAL LOCATION | Age: 89
End: 2025-01-07

## 2025-01-07 DIAGNOSIS — G30.1 MODERATE LATE ONSET ALZHEIMER'S DEMENTIA WITHOUT BEHAVIORAL DISTURBANCE, PSYCHOTIC DISTURBANCE, MOOD DISTURBANCE, OR ANXIETY (MULTI): ICD-10-CM

## 2025-01-07 DIAGNOSIS — F02.B0 MODERATE LATE ONSET ALZHEIMER'S DEMENTIA WITHOUT BEHAVIORAL DISTURBANCE, PSYCHOTIC DISTURBANCE, MOOD DISTURBANCE, OR ANXIETY (MULTI): ICD-10-CM

## 2025-01-07 DIAGNOSIS — R05.9 COUGH, UNSPECIFIED TYPE: Primary | ICD-10-CM

## 2025-01-07 DIAGNOSIS — Z51.5 HOSPICE CARE PATIENT: ICD-10-CM

## 2025-01-07 DIAGNOSIS — I50.33 ACUTE ON CHRONIC HEART FAILURE WITH PRESERVED EJECTION FRACTION: ICD-10-CM

## 2025-01-07 PROCEDURE — 99349 HOME/RES VST EST MOD MDM 40: CPT

## 2025-01-07 NOTE — PROGRESS NOTES
Subjective   Kelechi Wong is a 89 y.o. male who presents for No chief complaint on file..  Patient seen in room, wife is also present. States he is doing well but has persistent cough. No worsening shortness of breath. Cough is non productive. No fever. No sick contacts or congestion, runy nose. No chest pain. Can lay flat. Has leg swelling.             Objective   There were no vitals taken for this visit.   Physical Exam  Constitutional:       General: He is not in acute distress.  HENT:      Nose: Nose normal.   Eyes:      Conjunctiva/sclera: Conjunctivae normal.   Cardiovascular:      Rate and Rhythm: Normal rate.      Pulses: Normal pulses.      Heart sounds: No murmur heard.  Pulmonary:      Effort: Pulmonary effort is normal.      Comments: On oxygen, poor air entry, faint crackles    Musculoskeletal:      Right lower leg: Edema present.      Left lower leg: Edema present.      Comments: Pitting bilateral edema to upper shin. Ace wrap on right leg. Pulse intact.    Skin:     General: Skin is warm and dry.      Capillary Refill: Capillary refill takes less than 2 seconds.   Neurological:      Mental Status: He is alert.      Comments: Pleasantly confused, no distress.         Assessment & Plan  Cough, unspecified type  Patient with persistent non productive cough in setting of leg swelling and faint crackles in patient with HF history on diuretics. No fever, no focal lung sounds, no worsening dyspnea.   Will increase lasix dosing for one week and reassess.   symptomatic treatments discussed With patient and wife and both agreeabel.   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     Reviewed and approved by HARPREET HE on 1/7/25 at 11:29 PM.

## 2025-02-05 ENCOUNTER — NURSING HOME VISIT (OUTPATIENT)
Dept: POST ACUTE CARE | Facility: EXTERNAL LOCATION | Age: 89
End: 2025-02-05

## 2025-02-05 DIAGNOSIS — I25.10 ATHEROSCLEROSIS OF NATIVE CORONARY ARTERY OF NATIVE HEART WITHOUT ANGINA PECTORIS: ICD-10-CM

## 2025-02-05 DIAGNOSIS — Z51.5 HOSPICE CARE PATIENT: Primary | ICD-10-CM

## 2025-02-05 DIAGNOSIS — G30.1 MODERATE LATE ONSET ALZHEIMER'S DEMENTIA WITHOUT BEHAVIORAL DISTURBANCE, PSYCHOTIC DISTURBANCE, MOOD DISTURBANCE, OR ANXIETY (MULTI): ICD-10-CM

## 2025-02-05 DIAGNOSIS — F02.B0 MODERATE LATE ONSET ALZHEIMER'S DEMENTIA WITHOUT BEHAVIORAL DISTURBANCE, PSYCHOTIC DISTURBANCE, MOOD DISTURBANCE, OR ANXIETY (MULTI): ICD-10-CM

## 2025-02-05 DIAGNOSIS — E78.49 OTHER HYPERLIPIDEMIA: ICD-10-CM

## 2025-02-05 PROCEDURE — 99348 HOME/RES VST EST LOW MDM 30: CPT | Performed by: STUDENT IN AN ORGANIZED HEALTH CARE EDUCATION/TRAINING PROGRAM

## 2025-02-06 ASSESSMENT — ENCOUNTER SYMPTOMS
MUSCULOSKELETAL NEGATIVE: 1
WEAKNESS: 1
PSYCHIATRIC NEGATIVE: 1
RESPIRATORY NEGATIVE: 1
GASTROINTESTINAL NEGATIVE: 1
FATIGUE: 1
CARDIOVASCULAR NEGATIVE: 1

## 2025-02-07 NOTE — PROGRESS NOTES
Subjective   Patient ID: Kelechi Wong is a 89 y.o. male.    Patient seen on routine evaluation.  Regards that he is overall doing well, and symptoms have been overall well-controlled.  Recently, his wife has been significantly ill with multiple medical comorbidities and upper respiratory symptoms however he endorses no acute issues.  Regards that his symptoms are overall stable, and his oxygenation is overall stable as well.  Otherwise, states no acute issues or concerns and overall feels well.        Review of Systems   Constitutional:  Positive for fatigue.   HENT: Negative.     Respiratory: Negative.     Cardiovascular: Negative.    Gastrointestinal: Negative.    Musculoskeletal: Negative.    Skin: Negative.    Neurological:  Positive for weakness.   Psychiatric/Behavioral: Negative.         Objective Vitals Reviewed via facility EMR   Physical Exam  Constitutional:       General: He is not in acute distress.     Appearance: He is not ill-appearing.      Comments: Elderly male, on o2   Eyes:      Pupils: Pupils are equal, round, and reactive to light.   Cardiovascular:      Rate and Rhythm: Normal rate and regular rhythm.      Pulses: Normal pulses.      Heart sounds: No murmur heard.  Pulmonary:      Effort: No respiratory distress.      Breath sounds: No wheezing.   Abdominal:      General: Abdomen is flat. Bowel sounds are normal. There is no distension.   Musculoskeletal:      Right lower leg: No edema.      Left lower leg: No edema.   Skin:     General: Skin is warm and dry.   Neurological:      Mental Status: He is alert. Mental status is at baseline.      Cranial Nerves: No cranial nerve deficit.      Motor: Weakness present.   Psychiatric:         Mood and Affect: Mood normal.         Behavior: Behavior normal.         Assessment/Plan   Diagnoses and all orders for this visit:  Hospice care patient  Atherosclerosis of native coronary artery of native heart without angina pectoris  Other  hyperlipidemia  Moderate late onset Alzheimer's dementia without behavioral disturbance, psychotic disturbance, mood disturbance, or anxiety (Multi)        Patient seen and examined at bedside.  Overall medically stable, appreciate hospice recommendations, continue to closely monitor for signs symptoms of infection as wife is fairly ill with pneumonia at this time.  Otherwise, no changes at this time.  Continue supportive care.      Reviewed and approved by HARPREET HE on 2/6/25 at 8:43 PM.